# Patient Record
Sex: MALE | Race: WHITE
[De-identification: names, ages, dates, MRNs, and addresses within clinical notes are randomized per-mention and may not be internally consistent; named-entity substitution may affect disease eponyms.]

---

## 2019-06-12 ENCOUNTER — HOSPITAL ENCOUNTER (INPATIENT)
Dept: HOSPITAL 74 - JSAMEDAYSX | Age: 69
LOS: 1 days | Discharge: HOME | DRG: 658 | End: 2019-06-13
Attending: STUDENT IN AN ORGANIZED HEALTH CARE EDUCATION/TRAINING PROGRAM | Admitting: STUDENT IN AN ORGANIZED HEALTH CARE EDUCATION/TRAINING PROGRAM
Payer: COMMERCIAL

## 2019-06-12 VITALS — BODY MASS INDEX: 25.7 KG/M2

## 2019-06-12 DIAGNOSIS — E78.00: ICD-10-CM

## 2019-06-12 DIAGNOSIS — I10: ICD-10-CM

## 2019-06-12 DIAGNOSIS — C64.2: Primary | ICD-10-CM

## 2019-06-12 PROCEDURE — 0TT14ZZ RESECTION OF LEFT KIDNEY, PERCUTANEOUS ENDOSCOPIC APPROACH: ICD-10-PCS | Performed by: STUDENT IN AN ORGANIZED HEALTH CARE EDUCATION/TRAINING PROGRAM

## 2019-06-12 RX ADMIN — DOCUSATE SODIUM SCH MG: 100 CAPSULE, LIQUID FILLED ORAL at 22:19

## 2019-06-12 RX ADMIN — ACETAMINOPHEN SCH MG: 500 TABLET, FILM COATED ORAL at 18:26

## 2019-06-12 RX ADMIN — ACETAMINOPHEN ONE MG: 10 INJECTION, SOLUTION INTRAVENOUS at 12:00

## 2019-06-12 RX ADMIN — ACETAMINOPHEN ONE MG: 10 INJECTION, SOLUTION INTRAVENOUS at 11:55

## 2019-06-12 RX ADMIN — SODIUM CHLORIDE SCH MLS: 9 INJECTION, SOLUTION INTRAVENOUS at 15:15

## 2019-06-12 NOTE — SURG
Surgery First Assist Note


First Assist: Martha Driscoll PA-C


Date of Service: 06/12/19


Diagnosis: 





 metastatic left renal cell carcinoma





Procedure: 








 laparoscopic left nephrectomy





I was present for the entirety of the operative procedure. For further detail, 

please refer to operative report.

## 2019-06-12 NOTE — OP
Operative Note





- Note:


Operative Date: 06/12/19


Pre-Operative Diagnosis: metastatic left renal cell carcinoma


Operation: laparoscopic left nephrectomy


Surgeon: Griselda Nance


Assistant: Martha Driscoll


Anesthesiologist/CRNA: Martell Bunn


Anesthesia: General


Specimens Removed: left kidney


Estimated Blood Loss (mls): 150


Drains, Volume Out (mls): 1,340 (albright)


Fluid Volume Replaced (mls): 2,500


Operative Report Dictated: Yes

## 2019-06-13 VITALS — HEART RATE: 68 BPM | TEMPERATURE: 98.2 F | DIASTOLIC BLOOD PRESSURE: 76 MMHG | SYSTOLIC BLOOD PRESSURE: 128 MMHG

## 2019-06-13 LAB
ANION GAP SERPL CALC-SCNC: 6 MMOL/L (ref 8–16)
BUN SERPL-MCNC: 12.4 MG/DL (ref 7–18)
CALCIUM SERPL-MCNC: 8.3 MG/DL (ref 8.5–10.1)
CHLORIDE SERPL-SCNC: 102 MMOL/L (ref 98–107)
CO2 SERPL-SCNC: 27 MMOL/L (ref 21–32)
CREAT SERPL-MCNC: 1.4 MG/DL (ref 0.55–1.3)
DEPRECATED RDW RBC AUTO: 15.3 % (ref 11.9–15.9)
GLUCOSE SERPL-MCNC: 112 MG/DL (ref 74–106)
HCT VFR BLD CALC: 36.8 % (ref 35.4–49)
HGB BLD-MCNC: 12.2 GM/DL (ref 11.7–16.9)
MCH RBC QN AUTO: 31.6 PG (ref 25.7–33.7)
MCHC RBC AUTO-ENTMCNC: 33 G/DL (ref 32–35.9)
MCV RBC: 95.7 FL (ref 80–96)
PLATELET # BLD AUTO: 216 K/MM3 (ref 134–434)
PMV BLD: 9.5 FL (ref 7.5–11.1)
POTASSIUM SERPLBLD-SCNC: 4.4 MMOL/L (ref 3.5–5.1)
RBC # BLD AUTO: 3.85 M/MM3 (ref 4–5.6)
SODIUM SERPL-SCNC: 135 MMOL/L (ref 136–145)
WBC # BLD AUTO: 10.4 K/MM3 (ref 4–10)

## 2019-06-13 RX ADMIN — ACETAMINOPHEN SCH MG: 500 TABLET, FILM COATED ORAL at 05:38

## 2019-06-13 RX ADMIN — ACETAMINOPHEN SCH MG: 500 TABLET, FILM COATED ORAL at 11:53

## 2019-06-13 RX ADMIN — ACETAMINOPHEN SCH MG: 500 TABLET, FILM COATED ORAL at 17:04

## 2019-06-13 RX ADMIN — ACETAMINOPHEN SCH: 500 TABLET, FILM COATED ORAL at 00:00

## 2019-06-13 RX ADMIN — DOCUSATE SODIUM SCH MG: 100 CAPSULE, LIQUID FILLED ORAL at 10:07

## 2019-06-13 RX ADMIN — SODIUM CHLORIDE SCH: 9 INJECTION, SOLUTION INTRAVENOUS at 11:54

## 2019-06-13 NOTE — PN
Progress Note (short form)





- Note


Progress Note: 





Anesthesia postop note





68 y/o M s/p GA for Laparoscopic nephrectomy


POD#1, vss, aaox3, pain well controlled.


No anesthesia complications.

## 2019-06-13 NOTE — PN
Progress Note (short form)





- Note


Progress Note: 


70yo M s/p Lap Left nephrectomy POD 1, pt seen and examined at bedside.  Pt 

complaining of pain around the incision.  Pt denies fever, chills, n/v, 

diarrhea.  Pt states that has been producing a lot of urine out of albright.  





 Last Vital Signs











Temp Pulse Resp BP Pulse Ox


 


 98.0 F   65   14   125/74   97 


 


 06/13/19 08:16  06/13/19 08:16  06/13/19 08:16  06/13/19 08:16  06/12/19 21:00








 CBC, BMP





 06/13/19 07:45 





 06/13/19 07:45 








PE:


Gen: A&O X3


Resp: breathing comfortably


Abd: soft, nondistended, moderate tenderness around incision, incisions clean 

with no erythema or discharge.  


Ext: no edema








Problem List





- Problems


(1) Status post nephrectomy


Assessment/Plan: 


Plan


   -will DC albright and BETHEL later today. 


  -adv diet


  -pain control


  -will consider discharge later today if doing well. 





Case discussed with Dr. Carpenter who agrees with plan


Code(s): Z90.5 - ACQUIRED ABSENCE OF KIDNEY

## 2019-06-17 NOTE — OPR
Date of Surgery:  06/17/19





Pre-Procedure Information





Pre-op Diagnosis:Renal mass [N28.89]





Procedure Information





Procedure(s):


LAPAROSCOPICLEFT RADICALNEPHRECTOMY 





Anesthesia:General





Surgeon(s) and Role:


* Griselda Nance MD - Primary





Assistant:    


Date of Surgery:  06/17/19





Pre-Procedure Information





Pre-op Diagnosis:Renal mass [N28.89]





Procedure Information





Procedure(s):


LAPAROSCOPICLEFT RADICALNEPHRECTOMY 





Anesthesia:General





Surgeon(s) and Role:


* Griselda Nance MD - Primary





Assistant:       JAJA Salmeron.





Procedure Description





Indications: 


Left renal cell cancer.





Findings: 


Single artery and single vein - taken individually with 2 vascular loads 

Ethicon 45 powered stapler.





Procedure Details:


Pt was brought to operating room and placed in supine position. SCDs were 

applied, perioperativeantibiotics were administered, and general anesthesia 

was induced.16Fr Brand was inserted. He was then positioned in modified flank 

position with left side up,axillary roll in place,back roll, bottom leg bent 

and three pillows  hislegs. Left arm was placed on cuellar and he was 

secured in place tothe table. He was then prepped and draped in the usual 

sterile manner.





A Veress needle was used to insufflate the abdomen to 15mm Hg justabove the 

umbilicus and in the lateral rectus border. A 12mm trocar was inserted and 

there was no evidence of injury to any visceral structures. A second 12mm 

trocar was insertedunder the costal margin in the mid clavicular line.A third 

12mm trocar was inserted inferior to level of umbilicus again slightly lateral 

tothe mid clavicularline.Pneumo was maintained on 12mmHg throughout the rest 

of the case.





The white line of toldt was taken down with ligasure and colon retracted 

medially. Gonadalvein and ureter were encountered.The lower pole of the kidney 

was freed off the psoas muscleand lifted laterally, exposing the hilum. The 

hilum was dissected free consisting of single renal vein and one artery and 

these were taken separately with [id number] stapler x2with an extra 

hemolock along the distal edge of vein.Right adrenal gland was spared with 

ligasure.


Ureter and gonadal vein packet was controlled with blue stapler load.


Posterior, medial,and lateral attachments were then freed with ligasure 

allowing kidney to be mobilized.The mass, while posterior, did not invade 

psoas.Renal hilum, pancreas, colon, and adrenal were carefully inspected and 

there was no evidence of injury or bleeding.15mm endocatch bag was placed into 

theinferiortrocar and right kidney placed within. The trocars were removed.





Theendocatchbag was removed through the lower quadrant incisionwhich was 

extended laterallyand this was then closed withan inner layer of 0 vicryl 

sutures in a figure-of-eight fashion, and an outer layer of 0 vicryl running.


Hemostasis inspection was done again at the end and 10 Fr BETHEL drain was left in 

the nephrectomy bed. Subcutaneous tissues closed with 3-0 vicryl.Skin was 

closed with 4-0 monocrylinterruptedand dermabond.








Estimated Blood Loss: 150 ml.





Drains: 10 Fr BETHEL drain.

## 2022-07-21 NOTE — PATH
[facility] will not accept backdated referrals for any reason     Thank you Surgical Pathology Report



Patient Name:  NATIVIDAD PRADHAN

Accession #:  [id number]

[facility]. Rec. #:  [id number]                                                        

   /Age/Gender:  1950 (Age: 69) / M

Account:  [id number]                                                          

             Location: [address] [place] [address]

Taken:  2019

Received:  2019

Reported:  2019

Physicians:  Griselda Nance M.D.

  



Specimen(s) Received

 LEFT KIDNEY 





Clinical History

Malignant neoplasm of left kidney







Final Diagnosis

KIDNEY, LEFT, RADICAL NEPHRECTOMY:

CLEAR CELL RENAL CELL CARCINOMA, GRADE 3 (WHO/ISUP GRADE).

TUMOR MEASURES 5.0 X 4.6 X 3.0 CM (GROSS MEASUREMENT).

TUMOR LOCATED AT UPPER (UPPER-LATERAL) POLE.

TUMOR EXTENDS INTO PERINEPHRIC TISSUE AND RENAL SINUS.

TUMOR NECROSIS PRESENT, ~25-30%.

NO LYMPHOVASCULAR INVASION IDENTIFIED.

SURGICAL MARGINS ARE NEGATIVE.

ONE LYMPH NODE, NEGATIVE FOR CARCINOMA ON H&E AND CONFIRMED BY AE1/3

IMMUNOHISTOCHEMICAL STAIN.

NON-NEOPLASTIC RENAL PARENCHYMA SHOW FOCAL GLOBAL GLOMERULOSCLEROSIS, PATCHY

TUBULAR ATROPHY, MILD INTERSTITIAL FIBROSIS, FOCAL HYALINE ARTERIOLOSCLEROSIS,

AND MILD PATCHY CHRONIC INFLAMMATION CONSISTENT WITH HYPERTENSIVE

NEPHROSCLEROSIS.

AJCC (TNM) STAGE (8th ED): pT3a pN0.

SEE SUMMARY BELOW.





Comments

Surgical Pathology Cancer Case Summary

AJCC TNM Stage, 8th Edition 



KIDNEY: Nephrectomy



Procedure 

_X_ Radical nephrectomy



Specimen Laterality

_X_ Left



Tumor Site 

_X_ Upper pole (Upper-lateral)



Tumor Size

Greatest dimension (centimeters): 5 cm

Additional dimensions (centimeters): 4.6 x 3.0 cm



Tumor Focality

_X_ Unifocal



Histologic Type  

_X_ Clear cell renal cell carcinoma



Sarcomatoid Features 

_X_ Not identified



Rhabdoid Features 

_X_ Not identified



Histologic Grade (WHO / ISUP Grade) 

_X__ G3: Nucleoli conspicuous and eosinophilic at 100x magnification 



     Tumor Necrosis 

_X__ Present

      Specify percentage of necrosis: ~25-30%

     

Tumor Extension  

_X_ Tumor extension into perinephric tissue (beyond renal capsule)

_X_ Tumor extension into renal sinus



Margins  

_X_ Uninvolved by invasive carcinoma



     Lymphovascular Invasion (excluding renal vein and its segmental branches

and inferior vena cava)

_X_ Not identified



     Regional Lymph Nodes



Lymph Node Examination 

Number of Lymph Nodes Involved: 0

Number of Lymph Nodes Examined: 1



Pathologic Stage Classification (pTNM, AJCC 8th Edition) 



Primary Tumor (pT)

     _X_ pT3a:     Tumor extends into renal vein or its segmental branches, or

invades the pelvicalyceal system, or invades perirenal and/or renal sinus fat

but not beyond Gerota's fascia



Regional Lymph Nodes (pN)

_X_ pN0:     No regional lymph node metastasis



     

Pathologic Findings in Nonneoplastic Kidney 

_X_ Glomerular disease (specify type): focal global glomerulosclerosis 

_X_ Tubulointerstitial disease (specify type): patchy tubular atrophy and mild

interstitial fibrosis

_X_ Vascular disease (specify type): focal hyaline arteriolosclerosis

_X_ Mild patchy chronic inflammation



***Electronically Signed***

Valeri Jaime M.D.



Addendum     

Reported: 2019



Addendum Diagnosis

PAS special stain utilized to evaluate this case. Findings discussed with Dr. Nance.





 Valeri Jaime M.D.  

 



Gross Description

Received in formalin labeled "left kidney," is a 548 g nephrectomy specimen

including a 9.5 x 6.5 x 5.0 cm kidney with a 9 cm in length attached portion of

ureter, a 2 cm in length portion of renal vein and a 2 cm in length attached

portion of renal artery. The kidney displays abundant attached perinephric fat,

measuring up to 4 cm in thickness. Sectioning reveals a 5.0 x 4.6 x 3.0 cm

well-circumscribed mass in the upper-lateral pole of the kidney. The cut surface

of the mass displays red-yellow, gray-brown mottled parenchyma. The mass is

protruding from the renal parenchyma to the renal capsule into the perinephric

fat. The mass focally abuts the outer fascia (blue). No definitive invasion into

the renal sinus or renal pelvis is grossly identified. The renal vein appears

free of tumor. The remaining renal parenchyma is red-brown and unremarkable.

There is no adrenal gland identified. There is a possible hilar lymph node

identified measuring 0.6 cm in greatest dimension. Representative sections are

submitted in 14 cassettes as follows: 1-vesicular ureteral margins; 2-4-tumor to

perinephric fat with inked outer fascia; 5-additional tumor to outer capsule;

6-tumor to normal renal parenchyma; 7-8-tumor to renal sinus; 9-tumor to renal

pelvis; 10-additional representative tumor; 11-additional ureter; 12-possible

hilar lymph node; 13-uninvolved upper pole parenchyma; 14-uninvolved lower pole

parenchyma.

DL



saudi